# Patient Record
Sex: FEMALE | Race: WHITE | Employment: FULL TIME | URBAN - METROPOLITAN AREA
[De-identification: names, ages, dates, MRNs, and addresses within clinical notes are randomized per-mention and may not be internally consistent; named-entity substitution may affect disease eponyms.]

---

## 2018-08-06 ENCOUNTER — APPOINTMENT (OUTPATIENT)
Dept: CT IMAGING | Age: 58
End: 2018-08-06
Attending: EMERGENCY MEDICINE
Payer: COMMERCIAL

## 2018-08-06 ENCOUNTER — HOSPITAL ENCOUNTER (EMERGENCY)
Age: 58
Discharge: HOME | End: 2018-08-06
Attending: EMERGENCY MEDICINE
Payer: COMMERCIAL

## 2018-08-06 ENCOUNTER — APPOINTMENT (OUTPATIENT)
Dept: GENERAL RADIOLOGY | Age: 58
End: 2018-08-06
Attending: EMERGENCY MEDICINE
Payer: COMMERCIAL

## 2018-08-06 VITALS
OXYGEN SATURATION: 95 % | RESPIRATION RATE: 18 BRPM | BODY MASS INDEX: 22.07 KG/M2 | HEIGHT: 68 IN | TEMPERATURE: 97.7 F | SYSTOLIC BLOOD PRESSURE: 98 MMHG | WEIGHT: 145.6 LBS | DIASTOLIC BLOOD PRESSURE: 55 MMHG | HEART RATE: 79 BPM

## 2018-08-06 DIAGNOSIS — E86.0 DEHYDRATION: Primary | ICD-10-CM

## 2018-08-06 DIAGNOSIS — D32.9 MENINGIOMA (HCC): ICD-10-CM

## 2018-08-06 DIAGNOSIS — R55 NEAR SYNCOPE: ICD-10-CM

## 2018-08-06 LAB
ALBUMIN SERPL-MCNC: 3.1 G/DL (ref 3.5–5)
ALBUMIN SERPL-MCNC: 3.2 G/DL (ref 3.5–5)
ALBUMIN/GLOB SERPL: 0.7 {RATIO} (ref 1.1–2.2)
ALBUMIN/GLOB SERPL: 0.8 {RATIO} (ref 1.1–2.2)
ALP SERPL-CCNC: 120 U/L (ref 45–117)
ALP SERPL-CCNC: 167 U/L (ref 45–117)
ALT SERPL-CCNC: 19 U/L (ref 12–78)
ALT SERPL-CCNC: ABNORMAL U/L (ref 12–78)
ANION GAP SERPL CALC-SCNC: 4 MMOL/L (ref 5–15)
APPEARANCE UR: ABNORMAL
AST SERPL-CCNC: 15 U/L (ref 15–37)
AST SERPL-CCNC: ABNORMAL U/L (ref 15–37)
ATRIAL RATE: 97 BPM
BACTERIA URNS QL MICRO: NEGATIVE /HPF
BASOPHILS # BLD: 0 K/UL (ref 0–0.1)
BASOPHILS NFR BLD: 0 % (ref 0–1)
BILIRUB DIRECT SERPL-MCNC: 0.2 MG/DL (ref 0–0.2)
BILIRUB SERPL-MCNC: 0.6 MG/DL (ref 0.2–1)
BILIRUB SERPL-MCNC: 1.1 MG/DL (ref 0.2–1)
BILIRUB UR QL: NEGATIVE
BUN SERPL-MCNC: 13 MG/DL (ref 6–20)
BUN/CREAT SERPL: 12 (ref 12–20)
CALCIUM SERPL-MCNC: 8.3 MG/DL (ref 8.5–10.1)
CALCULATED P AXIS, ECG09: 72 DEGREES
CALCULATED R AXIS, ECG10: 8 DEGREES
CALCULATED T AXIS, ECG11: 179 DEGREES
CHLORIDE SERPL-SCNC: 107 MMOL/L (ref 97–108)
CO2 SERPL-SCNC: 23 MMOL/L (ref 21–32)
COLOR UR: ABNORMAL
CREAT SERPL-MCNC: 1.07 MG/DL (ref 0.55–1.02)
DIAGNOSIS, 93000: NORMAL
DIFFERENTIAL METHOD BLD: ABNORMAL
EOSINOPHIL # BLD: 0 K/UL (ref 0–0.4)
EOSINOPHIL NFR BLD: 0 % (ref 0–7)
EPITH CASTS URNS QL MICRO: ABNORMAL /LPF
ERYTHROCYTE [DISTWIDTH] IN BLOOD BY AUTOMATED COUNT: 13.4 % (ref 11.5–14.5)
GLOBULIN SER CALC-MCNC: 3.9 G/DL (ref 2–4)
GLOBULIN SER CALC-MCNC: 4.5 G/DL (ref 2–4)
GLUCOSE SERPL-MCNC: 81 MG/DL (ref 65–100)
GLUCOSE UR STRIP.AUTO-MCNC: NEGATIVE MG/DL
HCT VFR BLD AUTO: 42.6 % (ref 35–47)
HGB BLD-MCNC: 13.9 G/DL (ref 11.5–16)
HGB UR QL STRIP: NEGATIVE
IMM GRANULOCYTES # BLD: 0 K/UL
IMM GRANULOCYTES NFR BLD AUTO: 0 %
INR PPP: 1.1 (ref 0.9–1.1)
KETONES UR QL STRIP.AUTO: ABNORMAL MG/DL
LEUKOCYTE ESTERASE UR QL STRIP.AUTO: ABNORMAL
LYMPHOCYTES # BLD: 0.4 K/UL (ref 0.8–3.5)
LYMPHOCYTES NFR BLD: 4 % (ref 12–49)
MAGNESIUM SERPL-MCNC: 2.1 MG/DL (ref 1.6–2.4)
MCH RBC QN AUTO: 31.4 PG (ref 26–34)
MCHC RBC AUTO-ENTMCNC: 32.6 G/DL (ref 30–36.5)
MCV RBC AUTO: 96.4 FL (ref 80–99)
MONOCYTES # BLD: 0.1 K/UL (ref 0–1)
MONOCYTES NFR BLD: 1 % (ref 5–13)
NEUTS BAND NFR BLD MANUAL: 3 % (ref 0–6)
NEUTS SEG # BLD: 8.4 K/UL (ref 1.8–8)
NEUTS SEG NFR BLD: 92 % (ref 32–75)
NITRITE UR QL STRIP.AUTO: NEGATIVE
NRBC # BLD: 0 K/UL (ref 0–0.01)
NRBC BLD-RTO: 0 PER 100 WBC
P-R INTERVAL, ECG05: 108 MS
PH UR STRIP: 5 [PH] (ref 5–8)
PLATELET # BLD AUTO: 175 K/UL (ref 150–400)
PMV BLD AUTO: 11.3 FL (ref 8.9–12.9)
POTASSIUM SERPL-SCNC: ABNORMAL MMOL/L (ref 3.5–5.1)
PROT SERPL-MCNC: 7.1 G/DL (ref 6.4–8.2)
PROT SERPL-MCNC: 7.6 G/DL (ref 6.4–8.2)
PROT UR STRIP-MCNC: 30 MG/DL
PROTHROMBIN TIME: 10.8 SEC (ref 9–11.1)
Q-T INTERVAL, ECG07: 332 MS
QRS DURATION, ECG06: 76 MS
QTC CALCULATION (BEZET), ECG08: 421 MS
RBC # BLD AUTO: 4.42 M/UL (ref 3.8–5.2)
RBC #/AREA URNS HPF: ABNORMAL /HPF (ref 0–5)
RBC MORPH BLD: ABNORMAL
SODIUM SERPL-SCNC: 134 MMOL/L (ref 136–145)
SP GR UR REFRACTOMETRY: 1.02 (ref 1–1.03)
TROPONIN I SERPL-MCNC: <0.05 NG/ML
TROPONIN I SERPL-MCNC: <0.05 NG/ML
UR CULT HOLD, URHOLD: NORMAL
UROBILINOGEN UR QL STRIP.AUTO: 0.2 EU/DL (ref 0.2–1)
VENTRICULAR RATE, ECG03: 97 BPM
WBC # BLD AUTO: 8.9 K/UL (ref 3.6–11)
WBC URNS QL MICRO: ABNORMAL /HPF (ref 0–4)

## 2018-08-06 PROCEDURE — 83735 ASSAY OF MAGNESIUM: CPT | Performed by: EMERGENCY MEDICINE

## 2018-08-06 PROCEDURE — 74011250636 HC RX REV CODE- 250/636: Performed by: EMERGENCY MEDICINE

## 2018-08-06 PROCEDURE — 70450 CT HEAD/BRAIN W/O DYE: CPT

## 2018-08-06 PROCEDURE — 93005 ELECTROCARDIOGRAM TRACING: CPT

## 2018-08-06 PROCEDURE — 80076 HEPATIC FUNCTION PANEL: CPT | Performed by: EMERGENCY MEDICINE

## 2018-08-06 PROCEDURE — 71045 X-RAY EXAM CHEST 1 VIEW: CPT

## 2018-08-06 PROCEDURE — 81001 URINALYSIS AUTO W/SCOPE: CPT | Performed by: EMERGENCY MEDICINE

## 2018-08-06 PROCEDURE — 80053 COMPREHEN METABOLIC PANEL: CPT | Performed by: EMERGENCY MEDICINE

## 2018-08-06 PROCEDURE — 96361 HYDRATE IV INFUSION ADD-ON: CPT

## 2018-08-06 PROCEDURE — 96360 HYDRATION IV INFUSION INIT: CPT

## 2018-08-06 PROCEDURE — 85610 PROTHROMBIN TIME: CPT | Performed by: EMERGENCY MEDICINE

## 2018-08-06 PROCEDURE — 84484 ASSAY OF TROPONIN QUANT: CPT | Performed by: EMERGENCY MEDICINE

## 2018-08-06 PROCEDURE — 36415 COLL VENOUS BLD VENIPUNCTURE: CPT | Performed by: EMERGENCY MEDICINE

## 2018-08-06 PROCEDURE — 85025 COMPLETE CBC W/AUTO DIFF WBC: CPT | Performed by: EMERGENCY MEDICINE

## 2018-08-06 PROCEDURE — 99285 EMERGENCY DEPT VISIT HI MDM: CPT

## 2018-08-06 RX ORDER — ONDANSETRON 4 MG/1
4 TABLET, ORALLY DISINTEGRATING ORAL
Status: DISCONTINUED | OUTPATIENT
Start: 2018-08-06 | End: 2018-08-06 | Stop reason: HOSPADM

## 2018-08-06 RX ORDER — GUAIFENESIN 100 MG/5ML
325 LIQUID (ML) ORAL
Status: DISCONTINUED | OUTPATIENT
Start: 2018-08-06 | End: 2018-08-06 | Stop reason: SDUPTHER

## 2018-08-06 RX ADMIN — SODIUM CHLORIDE 1000 ML: 900 INJECTION, SOLUTION INTRAVENOUS at 12:04

## 2018-08-06 RX ADMIN — SODIUM CHLORIDE 1000 ML: 9 INJECTION, SOLUTION INTRAVENOUS at 10:17

## 2018-08-06 NOTE — CONSULTS
CARDIOLOGY CONSULT                  Subjective:    Date of  Admission: 8/6/2018  9:56 AM     Admission type:Emergency    Ciro June is a 60 y.o. female with SLE, Raynauds here with syncopal episode and consutled for abnormal EKG. 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 She had awoken and ran a few calls this AM and had been in the heat. She had not eaten but did drink some coffee. She noted feeling very dizzy and vomited with the episode x1. She notes that she feels back to normal. There was no associated chest discomfort, dyspnea, diaphoresis with the episode. She has borderline low BP at baseline but also takes amlodipine low daily for her Raynauds and it is not abnormal for her to have a BP in the 57N systolic. She had been seen some years ago by Dr. Jesus Muñoz for an abnormal EKG noted preoperatively. She had a stress echo which was normal without any signs of ischemia. She was noted to have diffuse ST changes at that time also. ED EKG showed diffuse ST depressions on her EKG. Labwork is pending. She has no c/o and is receiving IVF for hypotension. Fab Marks DO  Past Medical History:   Diagnosis Date    Lupus       Past Surgical History:   Procedure Laterality Date    HX ORTHOPAEDIC      right wrist sx w/ repairs     Allergies   Allergen Reactions    Sulfa (Sulfonamide Antibiotics) Rash      History reviewed. No pertinent family history. Current Facility-Administered Medications   Medication Dose Route Frequency    sodium chloride 0.9 % bolus infusion 1,000 mL  1,000 mL IntraVENous NOW    ondansetron (ZOFRAN ODT) tablet 4 mg  4 mg Oral NOW     No current outpatient prescriptions on file.          Review of Symptoms:  Gen - no F/C/S  Eyes - no vision changes  ENT - no sore throat, rhinorrhea, otalgia  CV - no CP, no palpitations, no orthopnea, no PND, no SHIRA  Resp no cough, no SOB/SANDERS  GI - no AP, no n/v/d/c   - no dysuria, no hematuria  MSK - no abnormal joint pains  Skin - no rashes  Neuro - no HA, no numbness, no weakness, no slurred speech  Psych - no change in mood         Physical Exam    Visit Vitals    /54    Pulse 89    Temp 97.7 °F (36.5 °C)    Resp 15    Ht 5' 8\" (1.727 m)    Wt 66 kg (145 lb 9.6 oz)    SpO2 99%    BMI 22.14 kg/m2     NAD  Skin warm and dry  Nl conjunctiva  Oropharynx without exudate. Neck supple  Lungs clear  Normal S1/ S2 with occasional ectopy  No Murmurs, click or Rubs  Abdomen soft and non tender  Pulses 2+ radials  Neuro:  Grossly intact  Appropriate      Cardiographics    Telemetry: normal sinus rhythm  ECG: normal sinus rhythm, nonspecific ST and T waves changes      Labs:   Recent Results (from the past 24 hour(s))   EKG, 12 LEAD, INITIAL    Collection Time: 08/06/18 10:03 AM   Result Value Ref Range    Ventricular Rate 97 BPM    Atrial Rate 97 BPM    P-R Interval 108 ms    QRS Duration 76 ms    Q-T Interval 332 ms    QTC Calculation (Bezet) 421 ms    Calculated P Axis 72 degrees    Calculated R Axis 8 degrees    Calculated T Axis 179 degrees    Diagnosis       Sinus rhythm with short IL  ST & T wave abnormality, consider inferior ischemia  ST & T wave abnormality, consider anterolateral ischemia  No previous ECGs available     CBC WITH AUTOMATED DIFF    Collection Time: 08/06/18 10:09 AM   Result Value Ref Range    WBC 8.9 3.6 - 11.0 K/uL    RBC 4.42 3.80 - 5.20 M/uL    HGB 13.9 11.5 - 16.0 g/dL    HCT 42.6 35.0 - 47.0 %    MCV 96.4 80.0 - 99.0 FL    MCH 31.4 26.0 - 34.0 PG    MCHC 32.6 30.0 - 36.5 g/dL    RDW 13.4 11.5 - 14.5 %    PLATELET 807 365 - 324 K/uL    MPV 11.3 8.9 - 12.9 FL    NRBC 0.0 0  WBC    ABSOLUTE NRBC 0.00 0.00 - 0.01 K/uL    NEUTROPHILS PENDING %    LYMPHOCYTES PENDING %    MONOCYTES PENDING %    EOSINOPHILS PENDING %    BASOPHILS PENDING %    IMMATURE GRANULOCYTES PENDING %    ABS. NEUTROPHILS PENDING K/UL    ABS. LYMPHOCYTES PENDING K/UL    ABS. MONOCYTES PENDING K/UL    ABS. EOSINOPHILS PENDING K/UL    ABS.  BASOPHILS PENDING K/UL    ABS. IMM. GRANS. PENDING K/UL    DF PENDING    PROTHROMBIN TIME + INR    Collection Time: 08/06/18 10:09 AM   Result Value Ref Range    INR 1.1 0.9 - 1.1      Prothrombin time 10.8 9.0 - 11.1 sec        Assessment and Plan: This is a 60 yof with no cardiac history presenting with syncopal episode and consulted for abnormal EKG. Her history is consistent with vasovagal symptoms in the setting of dehydration and borderline hypotension on vasodilator. Her EKG is markedly abnormal but is fairly consistent with previous studies. The ST changes are mildly worse so not totally ignorable. 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     Would rule out for MI with troponin x2, OK for DC if negative from cardiac perspective  Will arrange for an OP Holter and echo  Gave pt copy of her old EKG for her records    Thank you for this consult, please call with questions     Assessment:

## 2018-08-06 NOTE — ED PROVIDER NOTES
HPI Comments: 60 y.o. female with past medical history significant for lupus and hypothyroid, who presents via EMS to the ED with cc of syncope. Pt reports she had a syncopal episode this morning while standing in an elevator. She states she felt \"super\" dizzy prior to the syncopal episode. She adds she felt associated nausea with 1 episode of vomiting and finger tingling. At present, she states she feels asymptomatic and \"good. \" She reports low intake today of 2 cups of coffee with no food, and notes \"it was hot outside. \" Per EMS, pt received ASA but was not given nitro because she was hypotensive at 80/40. Pt denies cardiac history. She specifically denies any chest pain, SOB, fevers, chills, diarrhea, or abdominal pain. There are no other acute medical concerns at this time. Social Hx: denies Tobacco use, social EtOH use, denies Illicit Drug use  PCP: No primary care provider on file. Note written by Ro Bustillos, as dictated by Heather Mchugh MD 10:01 AM      The history is provided by the patient. No  was used. No past medical history on file. No past surgical history on file. No family history on file. Social History     Social History    Marital status: N/A     Spouse name: N/A    Number of children: N/A    Years of education: N/A     Occupational History    Not on file. Social History Main Topics    Smoking status: Not on file    Smokeless tobacco: Not on file    Alcohol use Not on file    Drug use: Not on file    Sexual activity: Not on file     Other Topics Concern    Not on file     Social History Narrative         ALLERGIES: Sulfa (sulfonamide antibiotics)    Review of Systems   Constitutional: Negative for chills and fever. Respiratory: Negative for shortness of breath. Cardiovascular: Negative for chest pain. Gastrointestinal: Positive for nausea and vomiting. Negative for abdominal pain and diarrhea.    Neurological: Positive for dizziness and syncope. All other systems reviewed and are negative. Vitals:    08/06/18 1141 08/06/18 1200 08/06/18 1230 08/06/18 1303   BP: 97/51 111/51 91/59 100/51   Pulse: 90 86 89 88   Resp: 16 15 22 17   Temp:       SpO2: (!) 77% (!) 89% 100% 94%   Weight:       Height:                Physical Exam   Constitutional: She is oriented to person, place, and time. She appears well-developed and well-nourished. No distress. NAD, AxOx4, speaking in complete sentences    'I got dizzy/ felt bad/ vomited and now I feel much better';     GCS = 15     HENT:   Head: Normocephalic and atraumatic. Nose: Nose normal.   Mouth/Throat: Oropharynx is clear and moist.   Cn intact    No facial droop/ slurred speech/ tongue deviation   Eyes: Conjunctivae and EOM are normal. Pupils are equal, round, and reactive to light. Right eye exhibits no discharge. Left eye exhibits no discharge. No scleral icterus. Neck: Normal range of motion. Neck supple. No JVD present. No tracheal deviation present. Cardiovascular: Normal rate, regular rhythm, normal heart sounds and intact distal pulses. Exam reveals no gallop and no friction rub. No murmur heard. Pulmonary/Chest: Effort normal and breath sounds normal. No respiratory distress. She has no wheezes. She has no rales. She exhibits no tenderness. Abdominal: Soft. Bowel sounds are normal. There is no tenderness. There is no rebound and no guarding. nttp     Genitourinary: No vaginal discharge found. Genitourinary Comments: Pt denies urinary/ vaginal complaints   Musculoskeletal: Normal range of motion. She exhibits no edema, tenderness or deformity. Neurological: She is alert and oriented to person, place, and time. She has normal reflexes. No cranial nerve deficit. She exhibits normal muscle tone. Coordination normal.   pt has motor/ CV/ Sensation grossly intact to all extremities, R = L in strength;   Skin: Skin is warm and dry. No rash noted. No erythema.  No pallor. Psychiatric: She has a normal mood and affect. Her behavior is normal. Thought content normal.   Nursing note and vitals reviewed. [facility]      ED Course       Procedures    Chief Complaint   Patient presents with    Dizziness       11:04 AM  The patients presenting problems have been discussed, and they are in agreement with the care plan formulated and outlined with them. I have encouraged them to ask questions as they arise throughout their visit. MEDICATIONS GIVEN:  Medications   sodium chloride 0.9 % bolus infusion 1,000 mL (1,000 mL IntraVENous New Bag 8/6/18 1017)   ondansetron (ZOFRAN ODT) tablet 4 mg (4 mg Oral Refused 8/6/18 1036)       LABS REVIEWED:  Labs Reviewed   CBC WITH AUTOMATED DIFF   METABOLIC PANEL, COMPREHENSIVE   TROPONIN I   PROTHROMBIN TIME + INR   MAGNESIUM   SAMPLES BEING HELD   SAMPLE TO BLOOD BANK       RADIOLOGY RESULTS:  The following have been ordered and reviewed:  _____________________________________________________________________  _____________________________________________________________________    EKG interpretation:   Rhythm: normal sinus rhythm; and regular . Rate (approx.): 96; Axis: normal; P wave: normal; QRS interval: normal ; ST/T wave: normal; Negative acute significant segmental elevations/ unchanged compared to prior EKG per Dr Shae Kelly;     PROCEDURES:        CONSULTATIONS:       PROGRESS NOTES:      DIAGNOSIS:    1. Dehydration    2. Near syncope    3. Meningioma (HCC)        PLAN:  1- dehydration/ nearsyncope - neg ed evaluation; Pt agrees w/ plans;  2 meningioma - 'due to have an MRI on Monday/ follow-up as previously arranged';       ED COURSE: The patients hospital course has been uncomplicated. CONSULT NOTE:  11:05 AM Milagro Vee MD spoke with Dr. Shae Kelly, Consult for Cardiology. Discussed available diagnostic tests and clinical findings.   Dr. Shae Kelly saw pt; states he had an old EKG of hers, today's EKG is not significantly changed. 11:06 AM  'feels better'; eating peaches;     1:02 PM  'felt weird again/ feel better now'; discussed possibilities/ agrees w/ head CT;     1:48 PM   Simran Flanagan's  results have been reviewed with her. She has been counseled regarding her diagnosis. She verbally conveys understanding and agreement of the signs, symptoms, diagnosis, treatment and prognosis and additionally agrees to Call/ Arrange follow up as recommended with Dr. Rufina Malik DO in 24 - 48 hours. She also agrees with the care-plan and conveys that all of her questions have been answered. I have also put together some discharge instructions for her that include: 1) educational information regarding their diagnosis, 2) how to care for their diagnosis at home, as well a 3) list of reasons why they would want to return to the ED prior to their follow-up appointment, should their condition change or for concerns.

## 2018-08-06 NOTE — Clinical Note
Thank you for allowing us to provide you with medical care today. We realize that you have many choices for your emergency care needs. We thank you for choosing [facility].  Please choose us in the future for any continued health care need s. We hope we addressed all of your medical concerns. We strive to provide excellent quality care in the Emergency Department. Anything less than excellent does not meet our expectations. The exam and treatment you received in the Emergency Depa rtment were for an emergent problem and are not intended as complete care. It is important that you follow up with a doctor, nurse practitioner, or physician's assistant for ongoing care. If your symptoms worsen or you do not improve as expected and you  are unable to reach your usual health care provider, you should return to the Emergency Department. We are available 24 hours a day. Take this sheet with you when you go to your follow-up visit. If you have any problem arranging the follow-up vis it, contact the Emergency Department immediately. Make an appointment your family doctor for follow up of this visit. Return to the ER if you are unable to be seen in a timely manner.

## 2018-08-06 NOTE — ED NOTES
Pt ambulatory to restroom. Denies dizziness, CP, or SOB. 12:04 PM  Pt reports burning with urination. Urine sample collected. 12:59 PM  Pt sitting up in bed and reports onset of feeling \"weird\" again, same feeling 30 minutes prior to near syncopal event today. Denies CP. Describes feeling as slightly lightheaded. Pt instructed to lay down in bed. Dr. Yolanda Villarreal notified at at bedside to evaluate patient.

## 2018-08-06 NOTE — ED TRIAGE NOTES
Pt had sudden onset of dizziness, lowered self to ground and had episode of emesis. No LOC. Reports feeling better after emesis. Denies CP or SOB. Pt did not eat breakfast today, BG 95. EKG with EMS shows ST depression. Pt given 324 mg of ASA in route. SBP in 80s with EMS, reports SBP normally in 90s.  Denies pain or complaints on arrival.

## 2018-08-06 NOTE — ED NOTES
Pt given discharge instructions, patient education, and follow up information. Pt states understanding. All questions answered. Pt discharged to home in private vehicle w/ ride, ambulatory. Pt A/Ox4, RA, pain controlled.

## 2018-08-06 NOTE — DISCHARGE INSTRUCTIONS
Dehydration: Care Instructions  Your Care Instructions  Dehydration happens when your body loses too much fluid. This might happen when you do not drink enough water or you lose large amounts of fluids from your body because of diarrhea, vomiting, or sweating. Severe dehydration can be life-threatening. Water and minerals called electrolytes help put your body fluids back in balance. Learn the early signs of fluid loss, and drink more fluids to prevent dehydration. Follow-up care is a key part of your treatment and safety. Be sure to make and go to all appointments, and call your doctor if you are having problems. It's also a good idea to know your test results and keep a list of the medicines you take. How can you care for yourself at home? · To prevent dehydration, drink plenty of fluids, enough so that your urine is light yellow or clear like water. Choose water and other caffeine-free clear liquids until you feel better. If you have kidney, heart, or liver disease and have to limit fluids, talk with your doctor before you increase the amount of fluids you drink. · If you do not feel like eating or drinking, try taking small sips of water, sports drinks, or other rehydration drinks. · Get plenty of rest.  To prevent dehydration  · Add more fluids to your diet and daily routine, unless your doctor has told you not to. · During hot weather, drink more fluids. Drink even more fluids if you exercise a lot. Stay away from drinks with alcohol or caffeine. · Watch for the symptoms of dehydration. These include:  ¨ A dry, sticky mouth. ¨ Dark yellow urine, and not much of it. ¨ Dry and sunken eyes. ¨ Feeling very tired. · Learn what problems can lead to dehydration. These include:  ¨ Diarrhea, fever, and vomiting. ¨ Any illness with a fever, such as pneumonia or the flu. ¨ Activities that cause heavy sweating, such as endurance races and heavy outdoor work in hot or humid weather.   ¨ Alcohol or drug abuse or withdrawal.  ¨ Certain medicines, such as cold and allergy pills (antihistamines), diet pills (diuretics), and laxatives. ¨ Certain diseases, such as diabetes, cancer, and heart or kidney disease. When should you call for help? Call 911 anytime you think you may need emergency care. For example, call if:    · You passed out (lost consciousness).    Call your doctor now or seek immediate medical care if:    · You are confused and cannot think clearly.     · You are dizzy or lightheaded, or you feel like you may faint.     · You have signs of needing more fluids. You have sunken eyes and a dry mouth, and you pass only a little dark urine.     · You cannot keep fluids down.    Watch closely for changes in your health, and be sure to contact your doctor if:    · You are not making tears.     · Your skin is very dry and sags slowly back into place after you pinch it.     · Your mouth and eyes are very dry. Where can you learn more? Go to http://conner-ayan.info/. Enter Z721 in the search box to learn more about \"Dehydration: Care Instructions. \"  Current as of: November 20, 2017  Content Version: 11.7  © 8186-9113 [employer]. Care instructions adapted under license by [employer] (which disclaims liability or warranty for this information). If you have questions about a medical condition or this instruction, always ask your healthcare professional. [place] [address] any warranty or liability for your use of this information. Fainting: Care Instructions  Your Care Instructions    When you faint, or pass out, you lose consciousness for a short time. A brief drop in blood flow to the brain often causes it. When you fall or lie down, more blood flows to your brain and you regain consciousness. Emotional stress, pain, or overheating-especially if you have been standing-can make you faint. In these cases, fainting is usually not serious. But fainting can be a sign of a more serious problem. Your doctor may want you to have more tests to rule out other causes. The treatment you need depends on the reason why you fainted. The doctor has checked you carefully, but problems can develop later. If you notice any problems or new symptoms, get medical treatment right away. Follow-up care is a key part of your treatment and safety. Be sure to make and go to all appointments, and call your doctor if you are having problems. It's also a good idea to know your test results and keep a list of the medicines you take. How can you care for yourself at home? · Drink plenty of fluids to prevent dehydration. If you have kidney, heart, or liver disease and have to limit fluids, talk with your doctor before you increase your fluid intake. When should you call for help? Call 911 anytime you think you may need emergency care. For example, call if:    · You have symptoms of a heart problem. These may include:  ¨ Chest pain or pressure. ¨ Severe trouble breathing. ¨ A fast or irregular heartbeat. ¨ Lightheadedness or sudden weakness. ¨ Coughing up pink, foamy mucus. ¨ Passing out. After you call 911, the  may tell you to chew 1 adult-strength or 2 to 4 low-dose aspirin. Wait for an ambulance. Do not try to drive yourself.     · You have symptoms of a stroke. These may include:  ¨ Sudden numbness, tingling, weakness, or loss of movement in your face, arm, or leg, especially on only one side of your body. ¨ Sudden vision changes. ¨ Sudden trouble speaking. ¨ Sudden confusion or trouble understanding simple statements. ¨ Sudden problems with walking or balance. ¨ A sudden, severe headache that is different from past headaches.     · You passed out (lost consciousness) again.    Watch closely for changes in your health, and be sure to contact your doctor if:    · You do not get better as expected. Where can you learn more?   Go to http://conner-ayan.info/. Enter B988 in the search box to learn more about \"Fainting: Care Instructions. \"  Current as of: November 20, 2017  Content Version: 11.7  © 9080-7566 [employer], [facility]. Care instructions adapted under license by [employer] (which disclaims liability or warranty for this information). If you have questions about a medical condition or this instruction, always ask your healthcare professional. [place] [address] any warranty or liability for your use of this information.

## 2022-11-16 NOTE — PROGRESS NOTES
Vicki Mijares (: 1960) is a 60 y.o. female, patient, here for evaluation of the following chief complaint(s):  Shoulder Pain (right) and Arm Pain (right)       HPI:    She began having increased right shoulder and arm pain over 2 weeks ago. The patient reports no specific injury and states that her pain came on suddenly. She describes her right shoulder and arm pain as moderate, sharp, throbbing, aching, burning, and intermittent. Her right shoulder and arm pain does make it difficult for her to go to sleep and does wake her up from sleep. She has been experiencing some weakness in her right shoulder and upper extremity. The patient states that her pain continues to get worse. She states that lifting, twisting, and lying in bed make her pain worse. She has been taking ibuprofen and Advil dual action for her discomfort as needed. The patient was not seen in the emergency room for right shoulder pain and reports no previous or related right shoulder surgery. Allergies   Allergen Reactions    Sulfa (Sulfonamide Antibiotics) Rash       No current outpatient medications on file. No current facility-administered medications for this visit. Past Medical History:   Diagnosis Date    Brain tumor (benign) ([facility] [address]     Lupus ([facility] [address]         Past Surgical History:   Procedure Laterality Date    HX ORTHOPAEDIC      right wrist sx w/ repairs       History reviewed. No pertinent family history. Social History     Socioeconomic History    Marital status:      Spouse name: Not on file    Number of children: Not on file    Years of education: Not on file    Highest education level: Not on file   Occupational History    Not on file   Tobacco Use    Smoking status: Never    Smokeless tobacco: Never   Substance and Sexual Activity    Alcohol use:  Yes     Alcohol/week: 2.0 standard drinks     Types: 2 Glasses of wine per week     Comment: socially    Drug use: No    Sexual activity: Not on file   Other Topics Concern    Not on file   Social History Narrative    Not on file     Social Determinants of Health     Financial Resource Strain: Not on file   Food Insecurity: Not on file   Transportation Needs: Not on file   Physical Activity: Not on file   Stress: Not on file   Social Connections: Not on file   Intimate Partner Violence: Not on file   Housing Stability: Not on file       Review of Systems   All other systems reviewed and are negative. Vitals:  Ht 5' 8\" (1.727 m)   Wt 150 lb (68 kg)   BMI 22.81 kg/m²    Body mass index is 22.81 kg/m². Ortho Exam     The patient is well-developed and well-nourished. The patient presents today in alert and oriented x3 with a normal mood and affect. The patient stands with a normal weightbearing line and walks with a normal gait. Right shoulder/arm: No shoulder girdle atrophy. There is no soft tissue swelling, ecchymosis, abrasions, or lacerations. Active range of motion of the shoulder is limited to 130 degrees of forward flexion, 120 degrees of lateral abduction, and 70 degrees of external rotation. Internal rotation is to the SI joint and is painful. Passive range of motion is full with a painful impingement sign and painful Long sign. Rotator cuff strength is painful to evaluate and is a 4+/5 with forward flexion and lateral abduction. External rotation strength is maintained. There is no crepitation about the joint. Palpation of the [facility] joint does reproduce discomfort and there is pain elicited with cross body abduction. Strength of the extremity is 5/5 strength at biceps/triceps/wrist extension and is comparable to the contralateral side. DTRs are intact at +2/4 and are symmetrical.  Cervical range of motion is full with no pain to palpation along the paraspinal musculature medial border of the scapula. Spurling's sign is negative. ASSESSMENT/PLAN:      1. Pain of right shoulder region  -     XR SHOULDER RT AP/LAT MIN 2 V; Future  2. Chronic right shoulder pain  -     XR SHOULDER RT AP/LAT MIN 2 V; Future  3. Right arm pain  4. Nontraumatic complete tear of right rotator cuff  -     MRI SHOULDER RT WO CONT; Future  5. Shoulder impingement, right  -     MRI SHOULDER RT WO CONT; Future  6. Arthritis of right acromioclavicular joint  -     MRI SHOULDER RT WO CONT; Future     XR Results (most recent):  Results from Appointment encounter on 11/17/22    XR SHOULDER RT AP/LAT MIN 2 V    Narrative  Right shoulder 3 view x-rays of the right shoulder shows no evidence of degenerative disease but there is evidence of what appears to be a fracture of the superior glenoid. This appears to be old and may be related to her 2018 injury. Evidence of mild AC joint arthritis. Evidence of moderate impingement. Glenohumeral joint spaces maintained. Below is the assessment and plan developed based on review of pertinent history, physical exam, labs, studies, and medications. We discussed the patient's right shoulder and arm pain and her signs, symptoms, physical exam, description of her pain, and x-rays are consistent with a rotator cuff tear, impingement, and AC joint arthritis. There is evidence of a possible previous fracture of her superior glenoid. This fracture appears to be old and is almost certainly to her 2807 injury. The possible treatment options were discussed with the patient and because of the duration of her increased pain, no improvement with multiple modalities of conservative management including an at-home exercise program, her physical exam, description of her pain, x-rays, and her inability to complete daily living activities and sleep without significant discomfort we elected to obtain an MRI of her right shoulder to further evaluate the severity of her rotator cuff tear, impingement, and AC joint arthritis.   The MRI images and results will be used in preoperative planning if and almost certainly when surgical intervention is necessary. The risks and benefits of the MRI were discussed in detail with the patient and she would like to proceed. We will schedule this at her convenience. I will see her back after MRI is complete to discuss the images, results, and further treatment options. In the interim, I did encourage her to ice when possible, modify her activity level based on her right shoulder pain, and use anti-inflammatory medication when necessary. The patient will also work on range of motion, strengthening, and stretching exercises with an at-home exercise program as pain tolerates. I will see her back as noted above after her right shoulder MRI is complete. **We will obtain an MRI for more information to determine the best treatment plan moving forward and help us prepare for surgical intervention if necessary. **    Return for After her right shoulder MRI is complete. An electronic signature was used to authenticate this note.   -- Ok Santiago MD

## 2022-11-17 ENCOUNTER — OFFICE VISIT (OUTPATIENT)
Dept: ORTHOPEDIC SURGERY | Age: 62
End: 2022-11-17
Payer: COMMERCIAL

## 2022-11-17 VITALS — BODY MASS INDEX: 22.73 KG/M2 | HEIGHT: 68 IN | WEIGHT: 150 LBS

## 2022-11-17 DIAGNOSIS — M75.121 NONTRAUMATIC COMPLETE TEAR OF RIGHT ROTATOR CUFF: ICD-10-CM

## 2022-11-17 DIAGNOSIS — G89.29 CHRONIC RIGHT SHOULDER PAIN: ICD-10-CM

## 2022-11-17 DIAGNOSIS — M19.011 ARTHRITIS OF RIGHT ACROMIOCLAVICULAR JOINT: ICD-10-CM

## 2022-11-17 DIAGNOSIS — M79.601 RIGHT ARM PAIN: ICD-10-CM

## 2022-11-17 DIAGNOSIS — M25.511 CHRONIC RIGHT SHOULDER PAIN: ICD-10-CM

## 2022-11-17 DIAGNOSIS — M75.41 SHOULDER IMPINGEMENT, RIGHT: ICD-10-CM

## 2022-11-17 DIAGNOSIS — M25.511 PAIN OF RIGHT SHOULDER REGION: Primary | ICD-10-CM

## 2022-11-17 PROCEDURE — 99213 OFFICE O/P EST LOW 20 MIN: CPT | Performed by: ORTHOPAEDIC SURGERY

## 2022-11-28 ENCOUNTER — OFFICE VISIT (OUTPATIENT)
Dept: ORTHOPEDIC SURGERY | Age: 62
End: 2022-11-28
Payer: COMMERCIAL

## 2022-11-28 DIAGNOSIS — M19.011 ARTHRITIS OF RIGHT ACROMIOCLAVICULAR JOINT: ICD-10-CM

## 2022-11-28 DIAGNOSIS — M24.011 LOOSE BODY IN RIGHT SHOULDER: ICD-10-CM

## 2022-11-28 DIAGNOSIS — G89.29 CHRONIC RIGHT SHOULDER PAIN: ICD-10-CM

## 2022-11-28 DIAGNOSIS — M19.011 GLENOHUMERAL ARTHRITIS, RIGHT: ICD-10-CM

## 2022-11-28 DIAGNOSIS — M75.21 BICEPS TENDINITIS, RIGHT: ICD-10-CM

## 2022-11-28 DIAGNOSIS — M25.511 PAIN OF RIGHT SHOULDER REGION: Primary | ICD-10-CM

## 2022-11-28 DIAGNOSIS — S43.431D GLENOID LABRUM TEAR, RIGHT, SUBSEQUENT ENCOUNTER: ICD-10-CM

## 2022-11-28 DIAGNOSIS — M25.511 CHRONIC RIGHT SHOULDER PAIN: ICD-10-CM

## 2022-11-28 DIAGNOSIS — M79.601 RIGHT ARM PAIN: ICD-10-CM

## 2022-11-28 DIAGNOSIS — M75.41 SHOULDER IMPINGEMENT, RIGHT: ICD-10-CM

## 2022-11-28 DIAGNOSIS — M75.121 NONTRAUMATIC COMPLETE TEAR OF RIGHT ROTATOR CUFF: ICD-10-CM

## 2022-11-28 PROCEDURE — 99214 OFFICE O/P EST MOD 30 MIN: CPT | Performed by: ORTHOPAEDIC SURGERY

## 2022-11-28 NOTE — PROGRESS NOTES
Yancy Monroe (: 1960) is a 60 y.o. female, patient, here for evaluation of the following chief complaint(s):  Shoulder Pain (Right, mri results)       HPI:    She was last seen for her right shoulder pain on 2022. Since then, the patient did have an MRI performed on her right shoulder on 2022. The patient states that her pain level is the same as it was at her last visit. She describes her right shoulder pain is sharp, aching, throbbing, and intermittent. Her right shoulder pain does make it difficult for her to go to sleep and does wake her up from sleep. Right shoulder MRI images and results were independently reviewed and they were consistent with the study being significantly limited by motion. There is a moderate grade interstitial tear of the superior subscapularis tendon. Low-grade undersurface tear of the mid supraspinatus footprint and critical zone, with associated low-grade muscle strain. Findings suggestive of possible sprain at the humeral attachment of the inferior glenohumeral ligament. Anterior glenoid labral tear with contour irregularity and blunting. Mild long head biceps tendinosis. Moderate acromioclavicular and glenohumeral osteoarthritis. Large glenohumeral joint effusion with synovitis and loose bodies. Allergies   Allergen Reactions    Codeine Itching    Sulfa (Sulfonamide Antibiotics) Rash       No current outpatient medications on file. No current facility-administered medications for this visit. Past Medical History:   Diagnosis Date    Brain tumor (benign) ([facility] [address]     Lupus ([facility] [address]         Past Surgical History:   Procedure Laterality Date    HX ORTHOPAEDIC      right wrist sx w/ repairs       History reviewed. No pertinent family history.      Social History     Socioeconomic History    Marital status:      Spouse name: Not on file    Number of children: Not on file    Years of education: Not on file    Highest education level: Not on file Occupational History    Not on file   Tobacco Use    Smoking status: Never    Smokeless tobacco: Never   Substance and Sexual Activity    Alcohol use: Yes     Alcohol/week: 2.0 standard drinks     Types: 2 Glasses of wine per week     Comment: socially    Drug use: No    Sexual activity: Not on file   Other Topics Concern    Not on file   Social History Narrative    Not on file     Social Determinants of Health     Financial Resource Strain: Not on file   Food Insecurity: Not on file   Transportation Needs: Not on file   Physical Activity: Not on file   Stress: Not on file   Social Connections: Not on file   Intimate Partner Violence: Not on file   Housing Stability: Not on file       Review of Systems   All other systems reviewed and are negative. Vitals: There were no vitals taken for this visit. There is no height or weight on file to calculate BMI. Ortho Exam     The patient is well-developed and well-nourished. The patient presents today in alert and oriented x3 with a normal mood and affect. The patient stands with a normal weightbearing line and walks with a normal gait. Right shoulder: No shoulder girdle atrophy. There is no soft tissue swelling, ecchymosis, abrasions, or lacerations. Active range of motion of the shoulder is limited to 130 degrees of forward flexion, 120 degrees of lateral abduction, and 70 degrees of external rotation. Internal rotation is to the SI joint and is painful. Passive range of motion is full with a painful impingement sign and painful Long sign. Rotator cuff strength is painful to evaluate and is a 4+/5 with forward flexion and lateral abduction. External rotation strength is maintained. There is no crepitation about the joint. Palpation of the [facility] [place] by [facility] joint does reproduce discomfort and there is pain elicited with cross body abduction. Strength of the extremity is 5/5 strength at biceps/triceps/wrist extension and is comparable to the contralateral side. DTRs are intact at +2/4 and are symmetrical.  Cervical range of motion is full with no pain to palpation along the paraspinal musculature medial border of the scapula. Spurling's sign is negative. ASSESSMENT/PLAN:      1. Pain of right shoulder region  2. Chronic right shoulder pain  3. Right arm pain  4. Nontraumatic complete tear of right rotator cuff  5. Shoulder impingement, right  6. Arthritis of right acromioclavicular joint  -     REFERRAL TO ORTHOPEDIC SURGERY  7. Loose body in right shoulder  -     REFERRAL TO ORTHOPEDIC SURGERY  8. Biceps tendinitis, right  9. Glenoid labrum tear, right, subsequent encounter  -     REFERRAL TO ORTHOPEDIC SURGERY  10. Glenohumeral arthritis, right     Below is the assessment and plan developed based on review of pertinent history, physical exam, labs, studies, and medications. We discussed the patient's ongoing right shoulder and arm pain and we independently reviewed her MRI images and results and they were consistent with study being significantly limited by motion. There is a moderate grade interstitial tear of the superior subscapularis tendon. Low-grade undersurface tear of the mid supraspinatus footprint and critical zone, with associated low-grade muscle strain. Findings suggestive of possible sprain at the humeral attachment of the inferior glenohumeral ligament. Anterior glenoid labral tear with contour irregularity and blunting. Mild long head biceps tendinosis. Moderate acromioclavicular and glenohumeral osteoarthritis. Large glenohumeral joint effusion with synovitis and loose bodies.   The possible treatment options were discussed with the patient and because of the duration of her increased pain, no improvement with multiple modalities of conservative management including an physical exam, description of her pain, past x-rays, independently reviewed MRI images and results, and her inability to complete daily living activities without significant discomfort we both decided that surgical intervention was the best treatment plan. The risks and benefits of a right shoulder arthroscopic exam with distal clavicle resection, extensive debridement, and open biceps tenodesis were discussed in detail with the patient and she would like to proceed. We will schedule this at her convenience. In the interim, I did encourage her to ice when possible, modify her activity level based on her right shoulder and arm pain, and use anti-inflammatory medication when necessary. She will work on range of motion, strengthening, and stretching exercises with an at-home exercise program as pain tolerates. I will see her back in the office on an as-needed basis or on the day of her upcoming right shoulder surgery. Return for In the office as needed or on the day of her upcoming right shoulder surgery. An electronic signature was used to authenticate this note.   -- Wan Paul MD

## 2022-12-20 DIAGNOSIS — M19.011 ARTHRITIS OF RIGHT ACROMIOCLAVICULAR JOINT: ICD-10-CM

## 2022-12-20 DIAGNOSIS — M24.011 LOOSE BODY IN RIGHT SHOULDER: ICD-10-CM

## 2022-12-20 DIAGNOSIS — M75.41 SHOULDER IMPINGEMENT, RIGHT: ICD-10-CM

## 2022-12-20 DIAGNOSIS — M75.121 NONTRAUMATIC COMPLETE TEAR OF RIGHT ROTATOR CUFF: Primary | ICD-10-CM

## 2022-12-20 RX ORDER — OXYCODONE AND ACETAMINOPHEN 5; 325 MG/1; MG/1
1 TABLET ORAL
Qty: 40 TABLET | Refills: 0 | Status: SHIPPED | OUTPATIENT
Start: 2022-12-20 | End: 2022-12-27

## 2022-12-28 NOTE — PROGRESS NOTES
Lexii García (: 1960) is a 60 y.o. female, patient, here for evaluation of the following chief complaint(s):  Shoulder Pain (right) and Surgical Follow-up (Sx 22)       HPI:    She is now approximately 1 week status post right shoulder arthroscopic exam with acromioplasty, distal clavicle resection, extensive debridement, and open biceps tenodesis. Her surgery was performed on 2022. She rates the severity of her postoperative right shoulder pain as a 2 out of 10. She describes her pain as sharp, aching, burning, and intermittent. Her postoperative right shoulder pain does not wake her up from sleep at night. She has been experiencing some postoperative weakness. The patient has been taking ibuprofen and narcotic pain medication for discomfort as needed. She has been wearing a sling for comfort, stability, and support. Allergies   Allergen Reactions    Codeine Itching    Sulfa (Sulfonamide Antibiotics) Rash       No current outpatient medications on file. No current facility-administered medications for this visit. Past Medical History:   Diagnosis Date    Brain tumor (benign) ([facility] [address]     Lupus ([facility] [address]         Past Surgical History:   Procedure Laterality Date    HX ORTHOPAEDIC      right wrist sx w/ repairs       History reviewed. No pertinent family history. Social History     Socioeconomic History    Marital status:      Spouse name: Not on file    Number of children: Not on file    Years of education: Not on file    Highest education level: Not on file   Occupational History    Not on file   Tobacco Use    Smoking status: Never    Smokeless tobacco: Never   Substance and Sexual Activity    Alcohol use:  Yes     Alcohol/week: 2.0 standard drinks     Types: 2 Glasses of wine per week     Comment: socially    Drug use: No    Sexual activity: Not on file   Other Topics Concern    Not on file   Social History Narrative    Not on file     Social Determinants of Health Financial Resource Strain: Not on file   Food Insecurity: Not on file   Transportation Needs: Not on file   Physical Activity: Not on file   Stress: Not on file   Social Connections: Not on file   Intimate Partner Violence: Not on file   Housing Stability: Not on file       Review of Systems   All other systems reviewed and are negative. Vitals:  Ht 5' 8\" (1.727 m)   Wt 150 lb (68 kg)   BMI 22.81 kg/m²    Body mass index is 22.81 kg/m². Ortho Exam     The patient is well-developed and well-nourished. The patient presents today in alert and oriented x3 with a normal mood and affect. The patient stands with a normal weightbearing line and walks with a normal gait. Right shoulder wounds clean and dry neurovascular intact. There is some ecchymosis but no erythema. Her stitches were removed and her incisions are healing nicely with no sign of irritation or infection and look normal.  She does have well-maintained elbow and wrist range of motion. Her shoulder range of motion has improved since her surgical procedure. She has approximately 90 degrees of forward flexion and 90 degrees of abduction. There is limitation in all planes secondary to her postoperative discomfort which is normal to be expected. Her shoulder strength was not tested today. Her sensations are intact and pulses are 2+. Her skin is healing nicely. ASSESSMENT/PLAN:      1. Pain of right shoulder region  2. Status post shoulder surgery     Below is the assessment and plan developed based on review of pertinent history, physical exam, labs, studies, and medications. We discussed the patient's recent right shoulder arthroscopic exam with acromioplasty, distal clavicle resection, extensive debridement, and open biceps tenodesis. Her surgery was performed on 12/21/2022. She is now approximately 1 week status postsurgical intervention.   The patient is progressing nicely in the early stages of her recovery and having the appropriate amount of expected postoperative discomfort at this time. We did remove the patient's stitches today in the office without complication. Her incisions are healing nicely with no sign of irritation or infection and look normal.  She does have well-maintained elbow and wrist range of motion. Her shoulder range of motion and her shoulder strength were not tested today. The patient will continue the postoperative protocol by remaining in her postoperative sling as needed for comfort, stability, and support. She may wean out of her sling at any point moving forward. The patient will start working on range of motion, strengthening, and stretching exercises with an at-home exercise program as pain tolerates. She may continue to increase activities as tolerated and as discussed today in the office. She is to avoid any significant lifting with her right upper extremity, power biceps activities, internal and external supination, and reaching behind her with her arm fully extended. The patient will continue to ice when possible, modify her activity level based on her postoperative right shoulder and arm pain, and use anti-inflammatory medication when necessary. I will see her back in 2 weeks for reevaluation and further discussion of the postoperative protocol. Return in about 2 weeks (around 1/12/2023) for Reevaluation and further discussion of the postop protocol. An electronic signature was used to authenticate this note.   -- Thor Stephenson MD

## 2022-12-29 ENCOUNTER — OFFICE VISIT (OUTPATIENT)
Dept: ORTHOPEDIC SURGERY | Age: 62
End: 2022-12-29
Payer: COMMERCIAL

## 2022-12-29 VITALS — BODY MASS INDEX: 22.73 KG/M2 | HEIGHT: 68 IN | WEIGHT: 150 LBS

## 2022-12-29 DIAGNOSIS — M25.511 PAIN OF RIGHT SHOULDER REGION: Primary | ICD-10-CM

## 2022-12-29 DIAGNOSIS — Z98.890 STATUS POST SHOULDER SURGERY: ICD-10-CM

## 2022-12-29 PROCEDURE — 99024 POSTOP FOLLOW-UP VISIT: CPT | Performed by: ORTHOPAEDIC SURGERY

## 2023-01-23 ENCOUNTER — OFFICE VISIT (OUTPATIENT)
Dept: ORTHOPEDIC SURGERY | Age: 63
End: 2023-01-23
Payer: COMMERCIAL

## 2023-01-23 VITALS — BODY MASS INDEX: 22.73 KG/M2 | HEIGHT: 68 IN | WEIGHT: 150 LBS

## 2023-01-23 DIAGNOSIS — M25.511 PAIN OF RIGHT SHOULDER REGION: Primary | ICD-10-CM

## 2023-01-23 DIAGNOSIS — Z98.890 STATUS POST SHOULDER SURGERY: ICD-10-CM

## 2023-01-23 PROCEDURE — 99024 POSTOP FOLLOW-UP VISIT: CPT | Performed by: ORTHOPAEDIC SURGERY

## 2023-01-23 NOTE — PROGRESS NOTES
Mazin Rodriguez (: 1960) is a 60 y.o. female, patient, here for evaluation of the following chief complaint(s):  Shoulder Pain (Right )       HPI:    She is now approaching 5 weeks status post right shoulder arthroscopic exam with acromioplasty, distal clavicle resection, extensive debridement, and open biceps tenodesis. Her surgery was performed on 2022. She was last seen on 2022. The patient states that her pain level has improved since her last visit and surgical procedure. She rates the severity of her postoperative right shoulder pain as a 4 or 5 out of 10. She describes her pain as sharp and intermittent. Her postoperative right shoulder pain does occasionally wake her up from sleep at night. She has been experiencing some postoperative weakness which is to be expected. She has been taking Advil for her discomfort as needed. The patient has been working on range of motion, strengthening, and stretching exercises with an at-home exercise program.  She reports that the medication and home exercises have helped reduce her postoperative right shoulder pain. Allergies   Allergen Reactions    Codeine Itching    Sulfa (Sulfonamide Antibiotics) Rash       No current outpatient medications on file. No current facility-administered medications for this visit. Past Medical History:   Diagnosis Date    Brain tumor (benign) ([facility] [address]     Lupus ([facility] [address]         Past Surgical History:   Procedure Laterality Date    HX ORTHOPAEDIC      right wrist sx w/ repairs       History reviewed. No pertinent family history. Social History     Socioeconomic History    Marital status:      Spouse name: Not on file    Number of children: Not on file    Years of education: Not on file    Highest education level: Not on file   Occupational History    Not on file   Tobacco Use    Smoking status: Never    Smokeless tobacco: Never   Substance and Sexual Activity    Alcohol use:  Yes     Alcohol/week: 2.0 standard drinks     Types: 2 Glasses of wine per week     Comment: socially    Drug use: No    Sexual activity: Not on file   Other Topics Concern    Not on file   Social History Narrative    Not on file     Social Determinants of Health     Financial Resource Strain: Not on file   Food Insecurity: Not on file   Transportation Needs: Not on file   Physical Activity: Not on file   Stress: Not on file   Social Connections: Not on file   Intimate Partner Violence: Not on file   Housing Stability: Not on file       Review of Systems   All other systems reviewed and are negative. Vitals:  Ht 5' 8\" (1.727 m)   Wt 150 lb (68 kg)   BMI 22.81 kg/m²    Body mass index is 22.81 kg/m². Ortho Exam     The patient is well-developed and well-nourished. The patient presents today in alert and oriented x3 with a normal mood and affect. The patient stands with a normal weightbearing line and walks with a normal gait. Right shoulder wounds are clean and dry neurovascular intact. There is no ecchymosis or erythema. Her incisions are well-healed with no sign of irritation or infection and look normal.  She does have full elbow and wrist range of motion. Her shoulder range of motion continues to improve nicely. She has approximately 150 degrees of forward flexion and 90 degrees of abduction. She has full internal rotation. Her strength has improved since her last visit. There is weakness noted compared to normal which is to be expected. Her sensations are intact and pulses are 2+. Her skin is well-healed. ASSESSMENT/PLAN:      1. Pain of right shoulder region  2. Status post shoulder surgery     Below is the assessment and plan developed based on review of pertinent history, physical exam, labs, studies, and medications. We discussed the patient's right shoulder arthroscopic exam with acromioplasty, distal clavicle resection, extensive debridement, and open biceps tenodesis.   Her surgery was performed on 12/21/2022. She is now approaching 5 weeks status postsurgical intervention. The patient continues to progress nicely in her recovery. Her pain is intermittent and well controlled. Her range of motion and strength both continue to improve. Her incisions are well-healed with no sign of irritation or infection and look normal.  The patient will continue the postoperative protocol by working on range of motion, strengthening, and stretching exercises with an at-home exercise program as pain tolerates. She may continue to increase activities as tolerated and as discussed today in the office. She is still to be cautious of any significant lifting, power biceps activities, internal and external supination, and reaching behind her with her arm fully extended. I did encourage her to ice when possible, modify her activity level based on her postoperative right shoulder and arm pain, and use anti-inflammatory medication when necessary. I will see her back in 3 to 4 weeks for reevaluation and further discussion of the postoperative protocol. Return in about 3 weeks (around 2/13/2023) for Reevaluation and further discussion of the postop protocol. An electronic signature was used to authenticate this note.   -- Irving Caballero MD